# Patient Record
Sex: MALE | Race: BLACK OR AFRICAN AMERICAN | NOT HISPANIC OR LATINO | URBAN - METROPOLITAN AREA
[De-identification: names, ages, dates, MRNs, and addresses within clinical notes are randomized per-mention and may not be internally consistent; named-entity substitution may affect disease eponyms.]

---

## 2017-04-18 ENCOUNTER — EMERGENCY (EMERGENCY)
Facility: HOSPITAL | Age: 37
LOS: 1 days | Discharge: HOME | End: 2017-04-18
Attending: EMERGENCY MEDICINE | Admitting: EMERGENCY MEDICINE
Payer: COMMERCIAL

## 2017-04-18 VITALS
DIASTOLIC BLOOD PRESSURE: 80 MMHG | SYSTOLIC BLOOD PRESSURE: 130 MMHG | OXYGEN SATURATION: 100 % | HEART RATE: 61 BPM | TEMPERATURE: 98 F | RESPIRATION RATE: 16 BRPM

## 2017-04-18 PROCEDURE — 69200 CLEAR OUTER EAR CANAL: CPT | Mod: LT

## 2017-04-18 PROCEDURE — 99283 EMERGENCY DEPT VISIT LOW MDM: CPT | Mod: 25

## 2017-04-18 NOTE — ED PROVIDER NOTE - OBJECTIVE STATEMENT
35 y/o M w/ no significant PMHx, presents to the ED c/o left ear foreign body x1 day. Pt states he believes he has cotton from a Qtip in his left ear. Denies fever, chills or any other complaints. NKDA.

## 2017-04-18 NOTE — ED PROVIDER NOTE - MEDICAL DECISION MAKING DETAILS
35 y/o M c/o cotton ball from Qtip in left ear. Used alligator Forceps to remove Qtip, pt now at baseline during exam.

## 2017-04-18 NOTE — ED PROVIDER NOTE - NS ED MD SCRIBE ATTENDING SCRIBE SECTIONS
HIV/VITAL SIGNS( Pullset)/PHYSICAL EXAM/HISTORY OF PRESENT ILLNESS/DISPOSITION/REVIEW OF SYSTEMS/PAST MEDICAL/SURGICAL/SOCIAL HISTORY

## 2017-07-09 ENCOUNTER — EMERGENCY (EMERGENCY)
Facility: HOSPITAL | Age: 37
LOS: 1 days | Discharge: HOME | End: 2017-07-09
Attending: EMERGENCY MEDICINE | Admitting: EMERGENCY MEDICINE
Payer: COMMERCIAL

## 2017-07-09 VITALS
OXYGEN SATURATION: 100 % | DIASTOLIC BLOOD PRESSURE: 62 MMHG | HEART RATE: 58 BPM | SYSTOLIC BLOOD PRESSURE: 115 MMHG | RESPIRATION RATE: 16 BRPM | TEMPERATURE: 98 F

## 2017-07-09 PROCEDURE — 99284 EMERGENCY DEPT VISIT MOD MDM: CPT | Mod: 25

## 2017-07-09 NOTE — ED PROVIDER NOTE - OBJECTIVE STATEMENT
36yoM no pmh pw left ear pain for 3 days. started gradually now worsened. pt trying to use q tips and has more pain. woke him from sleep. no fevers, chills, hearing loss, recent swimming, nausea, vomiting, diarrhea or other issues . 36yoM no pmh pw left ear pain for 3 days. started gradually now worsened. pt trying to use q tips and has more pain. woke him from sleep. no fevers, chills, hearing loss, tinnitus, vision changes, URI symptoms, ear discharge, recent swimming, nausea, vomiting, diarrhea or other issues, trauma.

## 2017-07-09 NOTE — ED PROVIDER NOTE - CARE PLAN
Principal Discharge DX:	Cerumen impaction  Instructions for follow-up, activity and diet:	1) Please follow-up with your primary care doctor in the next 5-7 days.  Please call tomorrow for an appointment.  If you cannot follow-up with your primary care doctor please return to the ED for any urgent issues.  2) You were given a copy of the tests performed today.  Please bring the results with you and review them with your primary care doctor.  3) If you have any worsening of symptoms or any other concerns please return to the ED immediately.  4) Please continue taking your home medications as directed.

## 2017-07-09 NOTE — ED ADULT TRIAGE NOTE - CHIEF COMPLAINT QUOTE
Pt BIBEMS [facility] from Home c/o Pain pressure on Left ear since Friday. claimed feels something moving inside

## 2017-07-09 NOTE — ED PROVIDER NOTE - ATTENDING CONTRIBUTION TO CARE
36M no PMH p/w L ear pain x3d, no other systemic complaints. Vitals wnl, exam as above.  ddx: Likely cerumen impaction  Hydrogen peroxide soaks, outpt pmd/ent f/u. Comfortable for dc. Tylenol/motrin prn for pain.

## 2017-07-09 NOTE — ED PROVIDER NOTE - LEFT EAR
hard, dark impacted ear wax mildy obscuring the TM. no erythema, no drainage, mildy tender/TM clear hard, dark impacted ear wax mildy obscuring the TM. visualized tm is clear, no erythema, no drainage,/TM clear

## 2019-11-17 NOTE — ED ADULT NURSE NOTE - OBJECTIVE STATEMENT
Pt received in #29, aaox3 with c/o left ear pain described as pressure and foreign body sensation x 2 days. Denies bleeding or drainage.
Alert and oriented to person, place and time

## 2021-05-27 NOTE — ED ADULT TRIAGE NOTE - NS ED NOTE AC HIGH RISK COUNTRIES
Other Countries/No Purse String (Simple) Text: Given the location of the defect and the characteristics of the surrounding skin a purse string closure was deemed most appropriate.  Undermining was performed circumfirentially around the surgical defect.  A purse string suture was then placed and tightened.

## 2022-10-17 ENCOUNTER — EMERGENCY (EMERGENCY)
Facility: HOSPITAL | Age: 42
LOS: 1 days | Discharge: HOME | End: 2022-10-17
Attending: EMERGENCY MEDICINE | Admitting: EMERGENCY MEDICINE

## 2022-10-17 VITALS
RESPIRATION RATE: 18 BRPM | HEART RATE: 64 BPM | TEMPERATURE: 98 F | OXYGEN SATURATION: 97 % | DIASTOLIC BLOOD PRESSURE: 90 MMHG | SYSTOLIC BLOOD PRESSURE: 135 MMHG

## 2022-10-17 LAB
ALBUMIN SERPL ELPH-MCNC: 4.4 G/DL — SIGNIFICANT CHANGE UP (ref 3.3–5)
ALP SERPL-CCNC: 82 U/L — SIGNIFICANT CHANGE UP (ref 40–120)
ALT FLD-CCNC: 32 U/L — SIGNIFICANT CHANGE UP (ref 4–41)
ANION GAP SERPL CALC-SCNC: 13 MMOL/L — SIGNIFICANT CHANGE UP (ref 7–14)
AST SERPL-CCNC: 23 U/L — SIGNIFICANT CHANGE UP (ref 4–40)
BASOPHILS # BLD AUTO: 0.02 K/UL — SIGNIFICANT CHANGE UP (ref 0–0.2)
BASOPHILS NFR BLD AUTO: 0.3 % — SIGNIFICANT CHANGE UP (ref 0–2)
BILIRUB SERPL-MCNC: 0.7 MG/DL — SIGNIFICANT CHANGE UP (ref 0.2–1.2)
BUN SERPL-MCNC: 13 MG/DL — SIGNIFICANT CHANGE UP (ref 7–23)
CALCIUM SERPL-MCNC: 9.3 MG/DL — SIGNIFICANT CHANGE UP (ref 8.4–10.5)
CHLORIDE SERPL-SCNC: 102 MMOL/L — SIGNIFICANT CHANGE UP (ref 98–107)
CO2 SERPL-SCNC: 23 MMOL/L — SIGNIFICANT CHANGE UP (ref 22–31)
CREAT SERPL-MCNC: 0.97 MG/DL — SIGNIFICANT CHANGE UP (ref 0.5–1.3)
EGFR: 99 ML/MIN/1.73M2 — SIGNIFICANT CHANGE UP
EOSINOPHIL # BLD AUTO: 0.12 K/UL — SIGNIFICANT CHANGE UP (ref 0–0.5)
EOSINOPHIL NFR BLD AUTO: 2 % — SIGNIFICANT CHANGE UP (ref 0–6)
GLUCOSE SERPL-MCNC: 93 MG/DL — SIGNIFICANT CHANGE UP (ref 70–99)
HCT VFR BLD CALC: 45.6 % — SIGNIFICANT CHANGE UP (ref 39–50)
HGB BLD-MCNC: 15.5 G/DL — SIGNIFICANT CHANGE UP (ref 13–17)
IANC: 3.15 K/UL — SIGNIFICANT CHANGE UP (ref 1.8–7.4)
IMM GRANULOCYTES NFR BLD AUTO: 0.2 % — SIGNIFICANT CHANGE UP (ref 0–0.9)
LYMPHOCYTES # BLD AUTO: 2.19 K/UL — SIGNIFICANT CHANGE UP (ref 1–3.3)
LYMPHOCYTES # BLD AUTO: 37.2 % — SIGNIFICANT CHANGE UP (ref 13–44)
MCHC RBC-ENTMCNC: 31.4 PG — SIGNIFICANT CHANGE UP (ref 27–34)
MCHC RBC-ENTMCNC: 34 GM/DL — SIGNIFICANT CHANGE UP (ref 32–36)
MCV RBC AUTO: 92.5 FL — SIGNIFICANT CHANGE UP (ref 80–100)
MONOCYTES # BLD AUTO: 0.39 K/UL — SIGNIFICANT CHANGE UP (ref 0–0.9)
MONOCYTES NFR BLD AUTO: 6.6 % — SIGNIFICANT CHANGE UP (ref 2–14)
NEUTROPHILS # BLD AUTO: 3.15 K/UL — SIGNIFICANT CHANGE UP (ref 1.8–7.4)
NEUTROPHILS NFR BLD AUTO: 53.7 % — SIGNIFICANT CHANGE UP (ref 43–77)
NRBC # BLD: 0 /100 WBCS — SIGNIFICANT CHANGE UP (ref 0–0)
NRBC # FLD: 0 K/UL — SIGNIFICANT CHANGE UP (ref 0–0)
PLATELET # BLD AUTO: 167 K/UL — SIGNIFICANT CHANGE UP (ref 150–400)
POTASSIUM SERPL-MCNC: 3.6 MMOL/L — SIGNIFICANT CHANGE UP (ref 3.5–5.3)
POTASSIUM SERPL-SCNC: 3.6 MMOL/L — SIGNIFICANT CHANGE UP (ref 3.5–5.3)
PROT SERPL-MCNC: 7.2 G/DL — SIGNIFICANT CHANGE UP (ref 6–8.3)
RBC # BLD: 4.93 M/UL — SIGNIFICANT CHANGE UP (ref 4.2–5.8)
RBC # FLD: 12.6 % — SIGNIFICANT CHANGE UP (ref 10.3–14.5)
SODIUM SERPL-SCNC: 138 MMOL/L — SIGNIFICANT CHANGE UP (ref 135–145)
TROPONIN T, HIGH SENSITIVITY RESULT: <6 NG/L — SIGNIFICANT CHANGE UP
TSH SERPL-MCNC: 2.14 UIU/ML — SIGNIFICANT CHANGE UP (ref 0.27–4.2)
WBC # BLD: 5.88 K/UL — SIGNIFICANT CHANGE UP (ref 3.8–10.5)
WBC # FLD AUTO: 5.88 K/UL — SIGNIFICANT CHANGE UP (ref 3.8–10.5)

## 2022-10-17 PROCEDURE — 99285 EMERGENCY DEPT VISIT HI MDM: CPT

## 2022-10-17 PROCEDURE — 93010 ELECTROCARDIOGRAM REPORT: CPT | Mod: NC

## 2022-10-17 NOTE — ED PROVIDER NOTE - NSFOLLOWUPCLINICS_GEN_ALL_ED_FT
[facility]  Cardiology  [address] [place] [id number]  Phone: [phone]  Fax:

## 2022-10-17 NOTE — ED ADULT NURSE NOTE - OBJECTIVE STATEMENT
pt is 42y male, here for heart palpitations, also report SOB on exertion, denies any CP, dizziness or heart disease, pt is a&ox3,ambulatory with steady gait, normal heart and lung sounds, NAD noted

## 2022-10-17 NOTE — ED PROVIDER NOTE - PHYSICAL EXAMINATION
GEN:  Non-toxic appearing, non-distressed, speaking full sentences, non-diaphoretic, AAOx3  HEENT:  NCAT, neck supple, EOMI, PERRLA, sclera anicteric, no conjunctival pallor or injection, no stridor, normal voice, no tonsillar exudate, uvula midline  CV:  regular rhythm and rate, s1/s2 audible, no murmurs, rubs or gallops, peripheral pulses 2+ and symmetric  PULM:  non-labored respirations, lungs clear to auscultation bilaterally, no wheezes, crackles or rales  ABD:  non distended, non-tender, no rebound, no guarding, negative Johnson's sign, bowel sounds normal, no cvat  MSK:  no gross deformity, non-tender extremities and joints, range of motion grossly normal appearing, no extremity edema, extremities warm and well perfused   NEURO:  AAOx3, CN II-XII intact, motor 5/5 in upper and lower extremities bilaterally, sensation grossly intact in extremities and trunk, finger to nose testing wnl, no nystagmus, negative Romberg, no pronator drift, no gait deficit  SKIN:  warm, dry, no rash or vesicles

## 2022-10-17 NOTE — ED PROVIDER NOTE - CLINICAL SUMMARY MEDICAL DECISION MAKING FREE TEXT BOX
43 yo M no sig pmh presents with episode of palpitations now resolved.  VSS.  Exam non-toxic appearing.  EKG nsr, twi in III.  Low concern for acute coronary syndrome as sx non-exertional, non-radiating, and there is no nausea or diaphoresis.  Low concern for aortic dissection as sx non-acute onset, not radiating to back, not described as "tearing", no pulse or neuro deficit on exam.  Low concern for pulmonary embolism, patient is PERC negative with low pre-test probability of PE. Plan for labs.  Dispo pending.

## 2022-10-17 NOTE — ED PROVIDER NOTE - OBJECTIVE STATEMENT
41 yo M no sig pmh presents with episode of palpitations now resolved.  Patient was in verbal dispute around 16:00, reports acute onset episode of palpitations lasting 1-2 hours.  Had this once prior >1 year ago.  Non-smoker, denies etoh or illicit drug use.  Reports caffeine intake.  No history of dvt/pe.  Denies family history of early cardiac death.  Denies cp, sob, dizziness, syncope, leg swelling, nausea, vomiting.

## 2022-10-17 NOTE — ED PROVIDER NOTE - PATIENT PORTAL LINK FT
You can access the FollowMyHealth Patient Portal offered by [facility] by registering at the following website: [email or web address]. By joining [employer]’s FollowMyHealth portal, you will also be able to view your health information using other applications (apps) compatible with our system.

## 2022-10-18 VITALS
TEMPERATURE: 98 F | HEART RATE: 54 BPM | RESPIRATION RATE: 18 BRPM | OXYGEN SATURATION: 100 % | DIASTOLIC BLOOD PRESSURE: 89 MMHG | SYSTOLIC BLOOD PRESSURE: 122 MMHG

## 2024-03-02 ENCOUNTER — EMERGENCY (EMERGENCY)
Facility: HOSPITAL | Age: 44
LOS: 1 days | Discharge: HOME | End: 2024-03-02
Attending: EMERGENCY MEDICINE | Admitting: EMERGENCY MEDICINE
Payer: COMMERCIAL

## 2024-03-02 VITALS
SYSTOLIC BLOOD PRESSURE: 120 MMHG | HEART RATE: 87 BPM | DIASTOLIC BLOOD PRESSURE: 80 MMHG | RESPIRATION RATE: 18 BRPM | OXYGEN SATURATION: 100 % | TEMPERATURE: 98 F

## 2024-03-02 VITALS
DIASTOLIC BLOOD PRESSURE: 89 MMHG | SYSTOLIC BLOOD PRESSURE: 138 MMHG | TEMPERATURE: 100 F | RESPIRATION RATE: 20 BRPM | OXYGEN SATURATION: 91 % | HEART RATE: 85 BPM

## 2024-03-02 LAB
ALBUMIN SERPL ELPH-MCNC: 4 G/DL — SIGNIFICANT CHANGE UP (ref 3.3–5)
ALP SERPL-CCNC: 106 U/L — SIGNIFICANT CHANGE UP (ref 40–120)
ALT FLD-CCNC: 126 U/L — HIGH (ref 4–41)
ANION GAP SERPL CALC-SCNC: 17 MMOL/L — HIGH (ref 7–14)
APTT BLD: 31.1 SEC — SIGNIFICANT CHANGE UP (ref 24.5–35.6)
AST SERPL-CCNC: 55 U/L — HIGH (ref 4–40)
BASOPHILS # BLD AUTO: 0.02 K/UL — SIGNIFICANT CHANGE UP (ref 0–0.2)
BASOPHILS NFR BLD AUTO: 0.2 % — SIGNIFICANT CHANGE UP (ref 0–2)
BILIRUB SERPL-MCNC: 0.6 MG/DL — SIGNIFICANT CHANGE UP (ref 0.2–1.2)
BUN SERPL-MCNC: 13 MG/DL — SIGNIFICANT CHANGE UP (ref 7–23)
CALCIUM SERPL-MCNC: 9.2 MG/DL — SIGNIFICANT CHANGE UP (ref 8.4–10.5)
CHLORIDE SERPL-SCNC: 101 MMOL/L — SIGNIFICANT CHANGE UP (ref 98–107)
CO2 SERPL-SCNC: 22 MMOL/L — SIGNIFICANT CHANGE UP (ref 22–31)
CREAT SERPL-MCNC: 1.02 MG/DL — SIGNIFICANT CHANGE UP (ref 0.5–1.3)
EGFR: 93 ML/MIN/1.73M2 — SIGNIFICANT CHANGE UP
EOSINOPHIL # BLD AUTO: 0.04 K/UL — SIGNIFICANT CHANGE UP (ref 0–0.5)
EOSINOPHIL NFR BLD AUTO: 0.4 % — SIGNIFICANT CHANGE UP (ref 0–6)
FLUAV AG NPH QL: SIGNIFICANT CHANGE UP
FLUBV AG NPH QL: SIGNIFICANT CHANGE UP
GLUCOSE SERPL-MCNC: 90 MG/DL — SIGNIFICANT CHANGE UP (ref 70–99)
HCT VFR BLD CALC: 40.6 % — SIGNIFICANT CHANGE UP (ref 39–50)
HGB BLD-MCNC: 13.5 G/DL — SIGNIFICANT CHANGE UP (ref 13–17)
IANC: 7.76 K/UL — HIGH (ref 1.8–7.4)
IMM GRANULOCYTES NFR BLD AUTO: 0.6 % — SIGNIFICANT CHANGE UP (ref 0–0.9)
INR BLD: 1.11 RATIO — SIGNIFICANT CHANGE UP (ref 0.85–1.18)
LYMPHOCYTES # BLD AUTO: 1.28 K/UL — SIGNIFICANT CHANGE UP (ref 1–3.3)
LYMPHOCYTES # BLD AUTO: 12.9 % — LOW (ref 13–44)
MCHC RBC-ENTMCNC: 30.8 PG — SIGNIFICANT CHANGE UP (ref 27–34)
MCHC RBC-ENTMCNC: 33.3 GM/DL — SIGNIFICANT CHANGE UP (ref 32–36)
MCV RBC AUTO: 92.7 FL — SIGNIFICANT CHANGE UP (ref 80–100)
MONOCYTES # BLD AUTO: 0.8 K/UL — SIGNIFICANT CHANGE UP (ref 0–0.9)
MONOCYTES NFR BLD AUTO: 8 % — SIGNIFICANT CHANGE UP (ref 2–14)
NEUTROPHILS # BLD AUTO: 7.76 K/UL — HIGH (ref 1.8–7.4)
NEUTROPHILS NFR BLD AUTO: 77.9 % — HIGH (ref 43–77)
NRBC # BLD: 0 /100 WBCS — SIGNIFICANT CHANGE UP (ref 0–0)
NRBC # FLD: 0 K/UL — SIGNIFICANT CHANGE UP (ref 0–0)
PLATELET # BLD AUTO: 235 K/UL — SIGNIFICANT CHANGE UP (ref 150–400)
POTASSIUM SERPL-MCNC: 4.1 MMOL/L — SIGNIFICANT CHANGE UP (ref 3.5–5.3)
POTASSIUM SERPL-SCNC: 4.1 MMOL/L — SIGNIFICANT CHANGE UP (ref 3.5–5.3)
PROT SERPL-MCNC: 8.4 G/DL — HIGH (ref 6–8.3)
PROTHROM AB SERPL-ACNC: 12.5 SEC — SIGNIFICANT CHANGE UP (ref 9.5–13)
RBC # BLD: 4.38 M/UL — SIGNIFICANT CHANGE UP (ref 4.2–5.8)
RBC # FLD: 12.5 % — SIGNIFICANT CHANGE UP (ref 10.3–14.5)
RSV RNA NPH QL NAA+NON-PROBE: DETECTED
SARS-COV-2 RNA SPEC QL NAA+PROBE: SIGNIFICANT CHANGE UP
SODIUM SERPL-SCNC: 140 MMOL/L — SIGNIFICANT CHANGE UP (ref 135–145)
WBC # BLD: 9.96 K/UL — SIGNIFICANT CHANGE UP (ref 3.8–10.5)
WBC # FLD AUTO: 9.96 K/UL — SIGNIFICANT CHANGE UP (ref 3.8–10.5)

## 2024-03-02 PROCEDURE — 71046 X-RAY EXAM CHEST 2 VIEWS: CPT | Mod: 26

## 2024-03-02 PROCEDURE — 99285 EMERGENCY DEPT VISIT HI MDM: CPT

## 2024-03-02 PROCEDURE — 93010 ELECTROCARDIOGRAM REPORT: CPT

## 2024-03-02 RX ORDER — SODIUM CHLORIDE 9 MG/ML
1000 INJECTION INTRAMUSCULAR; INTRAVENOUS; SUBCUTANEOUS ONCE
Refills: 0 | Status: COMPLETED | OUTPATIENT
Start: 2024-03-02 | End: 2024-03-02

## 2024-03-02 RX ORDER — CEFTRIAXONE 500 MG/1
1000 INJECTION, POWDER, FOR SOLUTION INTRAMUSCULAR; INTRAVENOUS ONCE
Refills: 0 | Status: COMPLETED | OUTPATIENT
Start: 2024-03-02 | End: 2024-03-02

## 2024-03-02 RX ORDER — ACETAMINOPHEN 500 MG
1000 TABLET ORAL ONCE
Refills: 0 | Status: COMPLETED | OUTPATIENT
Start: 2024-03-02 | End: 2024-03-02

## 2024-03-02 RX ORDER — KETOROLAC TROMETHAMINE 30 MG/ML
15 SYRINGE (ML) INJECTION ONCE
Refills: 0 | Status: DISCONTINUED | OUTPATIENT
Start: 2024-03-02 | End: 2024-03-02

## 2024-03-02 RX ORDER — AZITHROMYCIN 500 MG/1
500 TABLET, FILM COATED ORAL ONCE
Refills: 0 | Status: COMPLETED | OUTPATIENT
Start: 2024-03-02 | End: 2024-03-02

## 2024-03-02 RX ORDER — AZITHROMYCIN 500 MG/1
1 TABLET, FILM COATED ORAL
Qty: 4 | Refills: 0
Start: 2024-03-02 | End: 2024-03-05

## 2024-03-02 RX ADMIN — SODIUM CHLORIDE 1000 MILLILITER(S): 9 INJECTION INTRAMUSCULAR; INTRAVENOUS; SUBCUTANEOUS at 09:38

## 2024-03-02 RX ADMIN — AZITHROMYCIN 255 MILLIGRAM(S): 500 TABLET, FILM COATED ORAL at 11:38

## 2024-03-02 RX ADMIN — Medication 15 MILLIGRAM(S): at 09:38

## 2024-03-02 RX ADMIN — CEFTRIAXONE 100 MILLIGRAM(S): 500 INJECTION, POWDER, FOR SOLUTION INTRAMUSCULAR; INTRAVENOUS at 11:09

## 2024-03-02 RX ADMIN — Medication 400 MILLIGRAM(S): at 09:38

## 2024-03-02 NOTE — ED ADULT TRIAGE NOTE - CHIEF COMPLAINT QUOTE
c/o cough for the past 3 weeks, worse since last 3 days, with episodes of hemoptysis pt with shallow rapid respirations, able to communicate with 1-2 worth phrases. O2 91% RA, placed on O2 NC at 2 LPM with O2 improvement to 100%. keila Schwartz.

## 2024-03-02 NOTE — ED ADULT NURSE REASSESSMENT NOTE - NS ED NURSE REASSESS COMMENT FT1
Break RN: Pt resting comfortably in bed, reports improvement of symptoms. Pt updated on plan of care to be D/C'd home, verbalized understanding.

## 2024-03-02 NOTE — ED PROVIDER NOTE - CLINICAL SUMMARY MEDICAL DECISION MAKING FREE TEXT BOX
MDM/Summary/DDx (includes but is not limited to):  is a 43-year-old male with no past medical history does not drink does not smoke he is coming in with days of left lower anterior lateral rib pain with inspiration, associated with increasing amount of cough with a viral illness 2 weeks ago.  Concern for pneumonia especially given rust colored sputum.  Could be infectious otherwise, considering TB.  Though less likely.  Exam and history is not consistent with ACS.  Exam and history is not consistent at this time with a pulmonary embolism though will investigate further as   Labs and imaging result.  At this time meeting SIRS criteria, we will give fluids and without source will not antibiosis at this time.  CBC CMP PT PTT UA UC flu COVID blood cultures x 2 twelve-lead and bedside monitor.  Will get a chest x-ray and the disposition is pending labs imaging.   Pain control as needed.  No nausea in the room.    Triage note reviewed. VS reviewed. EKG reviewed and documented in "RESULTS" section, if possible at given time.     DDx in MDM includes the most likely ddx, but is not limited to solely what is listed. Clinical course may alter/deviate from the above plan. When possible, progress notes written, as needed, and are included in "PROGRESS NOTE" section below.       Medical, family, and social determinants of health reviewed and discussed w/ pt/family/caretaker, when allowable, and is incorporated into note above, whenever possible. MDM/Summary/DDx (includes but is not limited to):  is a 43-year-old male with no past medical history does not drink does not smoke he is coming in with days of left lower anterior lateral rib pain with inspiration, associated with increasing amount of cough with a viral illness 2 weeks ago.  Concern for pneumonia especially given rust colored sputum.  Could be infectious otherwise, considering TB.  Though less likely.  Exam and history is not consistent with ACS.  Exam and history is not consistent at this time with a pulmonary embolism though will investigate further as   Labs and imaging result.  At this time meeting SIRS criteria, we will give fluids and without source will not antibiosis at this time.  CBC CMP PT PTT UA UC flu COVID blood cultures x 2 twelve-lead and bedside monitor.  Will get a chest x-ray and the disposition is pending labs imaging.   Pain control as needed.  No nausea in the room.    Triage note reviewed. VS reviewed. EKG reviewed and documented in "RESULTS" section, if possible at given time.     DDx in MDM includes the most likely ddx, but is not limited to solely what is listed. Clinical course may alter/deviate from the above plan. When possible, progress notes written, as needed, and are included in "PROGRESS NOTE" section below.       Medical, family, and social determinants of health reviewed and discussed w/ pt/family/caretaker, when allowable, and is incorporated into note above, whenever possible.    jerrell: 43-year-old gentleman comes in complaining about cough and fever does not smoke he has been sick for about 2 weeks with cough fever but has more worsening shortness of breath last few days with pain on inspiration left     vital signs are 120/80 respiratory rate 18 heart rate 87 afebrile O2 sat 100.  Patient nontoxic-appearing here breathing without increased respiratory effort.  Looks comfortable.  Is able to ambulate well.    Chest x-ray consistent with pneumonia left side as well as RSV positive.  Patient instructed to return in 2 days if worse will be discharged on p.o. antibiotics and will be connected to primary care through discharge lounge

## 2024-03-02 NOTE — ED PROVIDER NOTE - NSFOLLOWUPINSTRUCTIONS_ED_ALL_ED_FT
- Please stay away from your baby while taking the antibiotics, you need to be 48 hours into taking the antibiotics and also temperature free in order to decrease the chances that baby gets sick. Please take the antibiotics and do not stop them until you finish the course.   Please come back to the emergency room for the following including but not limited to after 48 hours if you are still having fevers, if you are breathing gets worse or the pain is uncontrollable.    - Your testing/exams was/were reassuring that dangerous emergencies/conditions are less likely to be occurring or to have occurred.    - Take all medications, if given/sent to pharmacy, and as, directed.    - If you had labs or imaging done, you were given copies of all labs and/or imaging results from your er visit--please take them with you to your follow up appointments.  - If needed, call patient access services at [phone] to find a primary care physician (PCP). Call this number to follow up with a specialty service, such as the spine clinic. If you need this, call and say you were recently in the emergency department and you are calling, per my orders.   - Make sure you do not require a primary care physician's referral if you make a specialty clinic appointment directly. Some insurance requires you to see your PCP, get a referral, then make a specialty appointment.

## 2024-03-02 NOTE — ED ADULT NURSE NOTE - NSFALLRISKFACTORS_ED_ALL_ED
I noticed that her Wellbutrin prescription transmission failed so I call the prescription in per verbal order and left message on their voicemail. I will contact them on Monday to clarify patient's prescriptions issues.      No indicators present

## 2024-03-02 NOTE — ED PROVIDER NOTE - PHYSICAL EXAMINATION
Exam as stated below:   CONSTITUTIONAL: In pain,   Appears uncomfortable in bed  SKIN: Warm dry. No rashes noted.   EYES: No scleral icterus. Conjunctiva pink.  ENT: Posterior pharynx with no erythema.  NECK: No ttp.    CARD: RRR. No murmurs.  RESP: no belly breathing, no tachypnea. diminished in LLL. No Crackles noted. No Wheezing noted.  ABD: rigid but Non-tender. Not distended.   MSK: No pedal edema. No calf tenderness.  NEURO: UE/LE grossly intact. Motor UE/LE sensation grossly intact. CN II-XII grossly intact.   PSYCH: Cooperative, appropriate.

## 2024-03-02 NOTE — ED PROVIDER NOTE - PATIENT PORTAL LINK FT
You can access the FollowMyHealth Patient Portal offered by [facility] by registering at the following website: [email or web address]. By joining [employer]’s FollowMyHealth portal, you will also be able to view your health information using other applications (apps) compatible with our system.

## 2024-03-02 NOTE — ED ADULT NURSE NOTE - NSFALLUNIVINTERV_ED_ALL_ED
Bed/Stretcher in lowest position, wheels locked, appropriate side rails in place/Call bell, personal items and telephone in reach/Instruct patient to call for assistance before getting out of bed/chair/stretcher/Non-slip footwear applied when patient is off stretcher/[place] to call system/Physically safe environment - no spills, clutter or unnecessary equipment/Purposeful proactive rounding/Room/bathroom lighting operational, light cord in reach

## 2024-03-02 NOTE — ED PROVIDER NOTE - ATTENDING CONTRIBUTION TO CARE
annabellecarla: 43-year-old gentleman comes in complaining about cough and fever does not smoke he has been sick for about 2 weeks with cough fever but has more worsening shortness of breath last few days with pain on inspiration left     vital signs are 120/80 respiratory rate 18 heart rate 87 afebrile O2 sat 100.  Patient nontoxic-appearing here breathing without increased respiratory effort.  Looks comfortable.  Is able to ambulate well.    Chest x-ray consistent with pneumonia left side as well as RSV positive.  Patient instructed to return in 2 days if worse will be discharged on p.o. antibiotics and will be connected to primary care through discharge lounge    I performed a history and physical exam of the patient and discussed their management with the resident and /or advanced care provider. I personally made/approved the management plan and take responsibility for the patient management. I reviewed the resident and /or ACP's note and agree with the documented findings and plan of care. My medical decison making and observations are found above.

## 2024-03-03 LAB
CULTURE RESULTS: NO GROWTH — SIGNIFICANT CHANGE UP
SPECIMEN SOURCE: SIGNIFICANT CHANGE UP

## 2024-03-07 LAB
CULTURE RESULTS: SIGNIFICANT CHANGE UP
CULTURE RESULTS: SIGNIFICANT CHANGE UP
SPECIMEN SOURCE: SIGNIFICANT CHANGE UP
SPECIMEN SOURCE: SIGNIFICANT CHANGE UP
